# Patient Record
Sex: MALE | Race: WHITE | NOT HISPANIC OR LATINO | URBAN - METROPOLITAN AREA
[De-identification: names, ages, dates, MRNs, and addresses within clinical notes are randomized per-mention and may not be internally consistent; named-entity substitution may affect disease eponyms.]

---

## 2024-04-29 ENCOUNTER — EMERGENCY (EMERGENCY)
Facility: HOSPITAL | Age: 24
LOS: 1 days | Discharge: HOME | End: 2024-04-29
Attending: STUDENT IN AN ORGANIZED HEALTH CARE EDUCATION/TRAINING PROGRAM | Admitting: STUDENT IN AN ORGANIZED HEALTH CARE EDUCATION/TRAINING PROGRAM
Payer: COMMERCIAL

## 2024-04-29 VITALS
HEART RATE: 87 BPM | TEMPERATURE: 98 F | DIASTOLIC BLOOD PRESSURE: 85 MMHG | SYSTOLIC BLOOD PRESSURE: 121 MMHG | HEIGHT: 67 IN | WEIGHT: 145.06 LBS | OXYGEN SATURATION: 98 % | RESPIRATION RATE: 18 BRPM

## 2024-04-29 PROCEDURE — 73070 X-RAY EXAM OF ELBOW: CPT | Mod: 26,RT

## 2024-04-29 PROCEDURE — 73090 X-RAY EXAM OF FOREARM: CPT | Mod: 26,RT

## 2024-04-29 PROCEDURE — 73110 X-RAY EXAM OF WRIST: CPT | Mod: 26,RT

## 2024-04-29 PROCEDURE — 99285 EMERGENCY DEPT VISIT HI MDM: CPT

## 2024-04-29 RX ORDER — MORPHINE SULFATE 50 MG/1
4 CAPSULE, EXTENDED RELEASE ORAL ONCE
Refills: 0 | Status: DISCONTINUED | OUTPATIENT
Start: 2024-04-29 | End: 2024-04-29

## 2024-04-29 RX ORDER — OXYCODONE HYDROCHLORIDE 5 MG/1
5 TABLET ORAL ONCE
Refills: 0 | Status: DISCONTINUED | OUTPATIENT
Start: 2024-04-29 | End: 2024-04-29

## 2024-04-29 RX ADMIN — OXYCODONE HYDROCHLORIDE 5 MILLIGRAM(S): 5 TABLET ORAL at 22:52

## 2024-04-29 RX ADMIN — MORPHINE SULFATE 4 MILLIGRAM(S): 50 CAPSULE, EXTENDED RELEASE ORAL at 23:56

## 2024-04-29 NOTE — ED ADULT NURSE NOTE - CHIEF COMPLAINT QUOTE
fell while  skating; with pain to right arm;  no head, neck or back pain; no head strike; not on blood thinners

## 2024-04-29 NOTE — ED ADULT TRIAGE NOTE - CHIEF COMPLAINT QUOTE
fell while  skating; with pain to right arm;  no head, neck or back pain; no head strike; not on blood thinners fell while  skating; with pain to right arm;  no head, neck or back pain; no head strike or LOC; not on blood thinners

## 2024-04-29 NOTE — ED ADULT NURSE NOTE - OBJECTIVE STATEMENT
Pt A&ox4 and able to speak in complete sentences. Pt breathing even and unlabored with equal chest rise and fall. pt arrived s/p fall during skating in which he sustained residual right arm pain and weakness. pt denies numbness, tingling, sob, fevers, chills, n, v, cp.

## 2024-04-30 VITALS
OXYGEN SATURATION: 99 % | DIASTOLIC BLOOD PRESSURE: 88 MMHG | RESPIRATION RATE: 18 BRPM | HEART RATE: 76 BPM | SYSTOLIC BLOOD PRESSURE: 132 MMHG

## 2024-04-30 LAB
ANION GAP SERPL CALC-SCNC: 14 MMOL/L — SIGNIFICANT CHANGE UP (ref 5–17)
BASOPHILS # BLD AUTO: 0.02 K/UL — SIGNIFICANT CHANGE UP (ref 0–0.2)
BASOPHILS NFR BLD AUTO: 0.1 % — SIGNIFICANT CHANGE UP (ref 0–2)
BLD GP AB SCN SERPL QL: NEGATIVE — SIGNIFICANT CHANGE UP
BUN SERPL-MCNC: 12 MG/DL — SIGNIFICANT CHANGE UP (ref 7–23)
CALCIUM SERPL-MCNC: 9.4 MG/DL — SIGNIFICANT CHANGE UP (ref 8.4–10.5)
CHLORIDE SERPL-SCNC: 100 MMOL/L — SIGNIFICANT CHANGE UP (ref 96–108)
CO2 SERPL-SCNC: 26 MMOL/L — SIGNIFICANT CHANGE UP (ref 22–31)
CREAT SERPL-MCNC: 0.79 MG/DL — SIGNIFICANT CHANGE UP (ref 0.5–1.3)
EGFR: 129 ML/MIN/1.73M2 — SIGNIFICANT CHANGE UP
EOSINOPHIL # BLD AUTO: 0.02 K/UL — SIGNIFICANT CHANGE UP (ref 0–0.5)
EOSINOPHIL NFR BLD AUTO: 0.1 % — SIGNIFICANT CHANGE UP (ref 0–6)
GLUCOSE SERPL-MCNC: 96 MG/DL — SIGNIFICANT CHANGE UP (ref 70–99)
HCT VFR BLD CALC: 49.6 % — SIGNIFICANT CHANGE UP (ref 39–50)
HGB BLD-MCNC: 16.4 G/DL — SIGNIFICANT CHANGE UP (ref 13–17)
IMM GRANULOCYTES NFR BLD AUTO: 0.5 % — SIGNIFICANT CHANGE UP (ref 0–0.9)
LYMPHOCYTES # BLD AUTO: 1.04 K/UL — SIGNIFICANT CHANGE UP (ref 1–3.3)
LYMPHOCYTES # BLD AUTO: 7.3 % — LOW (ref 13–44)
MCHC RBC-ENTMCNC: 30.3 PG — SIGNIFICANT CHANGE UP (ref 27–34)
MCHC RBC-ENTMCNC: 33.1 GM/DL — SIGNIFICANT CHANGE UP (ref 32–36)
MCV RBC AUTO: 91.5 FL — SIGNIFICANT CHANGE UP (ref 80–100)
MONOCYTES # BLD AUTO: 0.47 K/UL — SIGNIFICANT CHANGE UP (ref 0–0.9)
MONOCYTES NFR BLD AUTO: 3.3 % — SIGNIFICANT CHANGE UP (ref 2–14)
NEUTROPHILS # BLD AUTO: 12.55 K/UL — HIGH (ref 1.8–7.4)
NEUTROPHILS NFR BLD AUTO: 88.7 % — HIGH (ref 43–77)
NRBC # BLD: 0 /100 WBCS — SIGNIFICANT CHANGE UP (ref 0–0)
PLATELET # BLD AUTO: 221 K/UL — SIGNIFICANT CHANGE UP (ref 150–400)
POTASSIUM SERPL-MCNC: 4.1 MMOL/L — SIGNIFICANT CHANGE UP (ref 3.5–5.3)
POTASSIUM SERPL-SCNC: 4.1 MMOL/L — SIGNIFICANT CHANGE UP (ref 3.5–5.3)
RBC # BLD: 5.42 M/UL — SIGNIFICANT CHANGE UP (ref 4.2–5.8)
RBC # FLD: 12.6 % — SIGNIFICANT CHANGE UP (ref 10.3–14.5)
RH IG SCN BLD-IMP: POSITIVE — SIGNIFICANT CHANGE UP
RH IG SCN BLD-IMP: POSITIVE — SIGNIFICANT CHANGE UP
SODIUM SERPL-SCNC: 140 MMOL/L — SIGNIFICANT CHANGE UP (ref 135–145)
WBC # BLD: 14.17 K/UL — HIGH (ref 3.8–10.5)
WBC # FLD AUTO: 14.17 K/UL — HIGH (ref 3.8–10.5)

## 2024-04-30 PROCEDURE — 85025 COMPLETE CBC W/AUTO DIFF WBC: CPT

## 2024-04-30 PROCEDURE — 73090 X-RAY EXAM OF FOREARM: CPT

## 2024-04-30 PROCEDURE — 36415 COLL VENOUS BLD VENIPUNCTURE: CPT

## 2024-04-30 PROCEDURE — 86850 RBC ANTIBODY SCREEN: CPT

## 2024-04-30 PROCEDURE — 73090 X-RAY EXAM OF FOREARM: CPT | Mod: 26,RT

## 2024-04-30 PROCEDURE — 86901 BLOOD TYPING SEROLOGIC RH(D): CPT

## 2024-04-30 PROCEDURE — 73070 X-RAY EXAM OF ELBOW: CPT

## 2024-04-30 PROCEDURE — 99284 EMERGENCY DEPT VISIT MOD MDM: CPT | Mod: 25

## 2024-04-30 PROCEDURE — 96374 THER/PROPH/DIAG INJ IV PUSH: CPT

## 2024-04-30 PROCEDURE — 86900 BLOOD TYPING SEROLOGIC ABO: CPT

## 2024-04-30 PROCEDURE — 73110 X-RAY EXAM OF WRIST: CPT

## 2024-04-30 PROCEDURE — 80048 BASIC METABOLIC PNL TOTAL CA: CPT

## 2024-04-30 RX ORDER — OXYCODONE HYDROCHLORIDE 5 MG/1
5 TABLET ORAL ONCE
Refills: 0 | Status: DISCONTINUED | OUTPATIENT
Start: 2024-04-30 | End: 2024-04-30

## 2024-04-30 RX ORDER — OXYCODONE HYDROCHLORIDE 5 MG/1
1 TABLET ORAL
Qty: 6 | Refills: 0
Start: 2024-04-30 | End: 2024-05-02

## 2024-04-30 RX ADMIN — OXYCODONE HYDROCHLORIDE 5 MILLIGRAM(S): 5 TABLET ORAL at 01:51

## 2024-04-30 NOTE — ED PROVIDER NOTE - PATIENT PORTAL LINK FT
You can access the FollowMyHealth Patient Portal offered by [facility] by registering at the following website: [email or web address]. By joining [employer]’s FollowMyHealth portal, you will also be able to view your health information using other applications (apps) compatible with our system.

## 2024-04-30 NOTE — ED PROVIDER NOTE - CLINICAL SUMMARY MEDICAL DECISION MAKING FREE TEXT BOX
no medical hx, was skateboarding and fell off skateboard and landed on right arm. immediate pain and noted deformity to R forearm. no head strike. splinted by EMS pta. has been ambulatory since.   pt appears uncomfortable but nontoxic, stable vitals, RUE - +swelling and deformity to mid-forearm. no tenderness to wrist or elbow. neurovascularly intact. compartments soft / compressible.  suspect forearm fracture, less likely elbow / wrist fracture.  hx and exam w/o evidence of other traumatic injuries that warrant imaging.   plan - xray, analgesia prn  ortho consult

## 2024-04-30 NOTE — CONSULT NOTE ADULT - SUBJECTIVE AND OBJECTIVE BOX
23y Male presents c/o R wrist pain s/p mechanical fall when ice skating. Denies Headstrike/LOC. Denies numbness, tingling paresthesias in affected extremity. Able to ambulate after fall. No other orthopedic injuries at this time.    PAST MEDICAL & SURGICAL HISTORY:    MEDICATIONS  (STANDING):    Allergies    penicillin (Unknown)    Intolerances                            16.4   14.17 )-----------( 221      ( 29 Apr 2024 23:50 )             49.6           Vital Signs Last 24 Hrs  T(C): 36.4 (04-29-24 @ 22:31), Max: 36.4 (04-29-24 @ 22:31)  T(F): 97.6 (04-29-24 @ 22:31), Max: 97.6 (04-29-24 @ 22:31)  HR: 87 (04-29-24 @ 22:31) (87 - 87)  BP: 121/85 (04-29-24 @ 22:31) (121/85 - 121/85)  BP(mean): --  RR: 18 (04-29-24 @ 22:31) (18 - 18)  SpO2: 98% (04-29-24 @ 22:31) (98% - 98%)  Gen: NAD  RUE: Skin intact, +gross deformity of the wrist, +ecchymosis, +ain/pin/m/r/u function, SILT C5-T1, radial pulse intact, compartments soft, brisk cap refill. DRUJ stable, no pain over the scaphoid, no ttp over elbow, full elbow sup/pro/flex/exten without pain    Secondary Survey: Full ROM of unaffected extremities, SILT globally, compartments soft, no bony TTP over bony prominences, no calf TTP, able to SLR with bilaterale LE, no TTP along axial spine  Imaging: XR demonstates R/L both bone forearm fracture     A/P: 23y Male with R both bone fracture s/p STS placement  -Pain control  -NWB RUE in sling for comfort  -Keep splint clean and nicki  -Ice, elevate extremity  -Active movement of fingers encouraged  -Signs and symptoms of compartment syndrome were discussed with the patient and the family was advised to return to ED if suspected compartment syndrome  -Likely need for surgical intervention in future discussed with patient  -Follow up with Dr. Llamas within 1 week, call office for appointment  -Ortho stable for DC 23y Male presents c/o R forearm pain s/p mechanical fall when skateboarding. Denies Headstrike/LOC. Denies numbness, tingling paresthesias in affected extremity. Able to ambulate after fall. No other orthopedic injuries at this time.    PAST MEDICAL & SURGICAL HISTORY:    MEDICATIONS  (STANDING):    Allergies    penicillin (Unknown)    Intolerances                            16.4   14.17 )-----------( 221      ( 29 Apr 2024 23:50 )             49.6           Vital Signs Last 24 Hrs  T(C): 36.4 (04-29-24 @ 22:31), Max: 36.4 (04-29-24 @ 22:31)  T(F): 97.6 (04-29-24 @ 22:31), Max: 97.6 (04-29-24 @ 22:31)  HR: 87 (04-29-24 @ 22:31) (87 - 87)  BP: 121/85 (04-29-24 @ 22:31) (121/85 - 121/85)  BP(mean): --  RR: 18 (04-29-24 @ 22:31) (18 - 18)  SpO2: 98% (04-29-24 @ 22:31) (98% - 98%)  Gen: NAD  RUE: Skin intact, +gross deformity of the wrist, +ecchymosis, +ain/pin/m/r/u function, SILT C5-T1, radial pulse intact, compartments soft, brisk cap refill. DRUJ stable, no pain over the scaphoid, no ttp over elbow, full elbow sup/pro/flex/exten without pain    Secondary Survey: Full ROM of unaffected extremities, SILT globally, compartments soft, no bony TTP over bony prominences, no calf TTP, able to SLR with bilaterale LE, no TTP along axial spine  Imaging: XR demonstates R/L both bone forearm fracture     A/P: 23y Male with R both bone fracture s/p STS placement  -Pain control  -NWB RUE in sling for comfort  -Keep splint clean and nicki  -Ice, elevate extremity  -Active movement of fingers encouraged  -Signs and symptoms of compartment syndrome were discussed with the patient and the family was advised to return to ED if suspected compartment syndrome  -Likely need for surgical intervention in future discussed with patient  -Follow up with Dr. Llamas within 1 week, call office for appointment  -Ortho stable for DC

## 2024-04-30 NOTE — ED PROVIDER NOTE - NSFOLLOWUPINSTRUCTIONS_ED_ALL_ED_FT
Keep arm in splint. Keep splint clean / dry / in place until you are seen by orthopedics.   Wear sling for comfort.   Take tylenol / motrin for pain.   Take oxycodone for severe pain.     Follow up with Orthopedics within 1-2 weeks for further / continued evaluation. See office information listed here.     Return to the Emergency Department if you have any worsening or new symptoms such as inability to move the arm, numbness/tingling/paresthesias, severe swelling/redness, pain that cannot be controlled with over the counter medication, any chest pain or shortness of breath, or any other serious concerns.

## 2024-04-30 NOTE — ED PROVIDER NOTE - CARE PROVIDER_API CALL
Mo Llamas  Orthopaedic Surgery  [address]  [place] [address]  Phone: [phone]  Fax: [phone]  Follow Up Time:

## 2024-04-30 NOTE — ED PROVIDER NOTE - OBJECTIVE STATEMENT
23 yr old male, no medical hx, presents to the Emergency Department w arm pain. pt was skateboarding and fell off skateboard and landed on right arm. immediate pain and noted deformity to R forearm. no head strike, no loc, no ac/asa use. splinted by orthopedics pta. has been ambulatory since. no extremity weakness / numbness. no pain to elbow or shoulder. 23 yr old male, no medical hx, presents to the Emergency Department w arm pain. pt was skateboarding and fell off skateboard and landed on right arm. immediate pain and noted deformity to R forearm. no head strike, no loc, no ac/asa use. splinted by EMS pta. has been ambulatory since. no extremity weakness / numbness. no pain to elbow or shoulder.

## 2024-04-30 NOTE — ED PROVIDER NOTE - PHYSICAL EXAMINATION
RUE - skin intact. +swelling and deformity to forearm w diffuse tenderness. with support able to range at elbow and wrist no tenderness to elbow, wrist, digits, shoulder. 2+ radial pulses. cap refill <2 seconds. sensation intact all distributions distally. moving all digits.     Constitutional : non-toxic, no acute distress. awake, alert, oriented to person, place, time/situation.  Head : head normocephalic, atraumatic  EENMT : eyes clear bilaterally, PERRL. airway patent. neck supple.  Cardiac : Regular rate. Extremities warm and well perfused. 2+ radial and DP pulses. cap refill <2 seconds. no LE edema.  Resp : Respirations even and unlabored.   MSK :  range of motion is not limited. moving all extremities.  Back : No evidence of trauma.   Neuro : Alert and oriented, CNII-XII grossly intact, no motor or sensory deficits.  Skin : Skin normal color for race, warm, dry and intact. No evidence of rash.  Psych : Alert and oriented to person, place, time/situation. normal mood and affect. no apparent risk to self or others. RUE - skin intact. +swelling and deformity to forearm w diffuse tenderness. with support able to range at elbow and wrist no tenderness to elbow, wrist, digits, shoulder. 2+ radial pulses. cap refill <2 seconds. sensation intact all distributions distally. moving all digits. compartments of forearm soft, easily compressible.     Constitutional : non-toxic, no acute distress. awake, alert, oriented to person, place, time/situation.  Head : head normocephalic, atraumatic  EENMT : eyes clear bilaterally, PERRL. airway patent. neck supple.  Cardiac : Regular rate. Extremities warm and well perfused. 2+ radial and DP pulses. cap refill <2 seconds. no LE edema.  Resp : Respirations even and unlabored.   MSK :  range of motion is not limited. moving all extremities.  Back : No evidence of trauma.   Neuro : Alert and oriented, CNII-XII grossly intact, no motor or sensory deficits.  Skin : Skin normal color for race, warm, dry and intact. No evidence of rash.  Psych : Alert and oriented to person, place, time/situation. normal mood and affect. no apparent risk to self or others.

## 2024-04-30 NOTE — ED PROVIDER NOTE - PROGRESS NOTE DETAILS
XR w midshaft radius / ulna fracture  ortho consulted. splinted by ortho and post splint XR ok.   remains neurovascularly intact in splint. pain controlled. ok for dc and outpt follow up w ortho.   return precautions re compartment syndrome discussed.     All results reviewed with the patient verbally. Discharge plan and return precautions d/w pt who verbalized understanding and agrees with plan. All questions answered. Vitals WNL. Ready for d/c.

## 2024-05-03 ENCOUNTER — EMERGENCY (EMERGENCY)
Facility: HOSPITAL | Age: 24
LOS: 1 days | Discharge: HOME | End: 2024-05-03
Attending: EMERGENCY MEDICINE | Admitting: EMERGENCY MEDICINE
Payer: COMMERCIAL

## 2024-05-03 VITALS
RESPIRATION RATE: 17 BRPM | OXYGEN SATURATION: 97 % | HEART RATE: 53 BPM | DIASTOLIC BLOOD PRESSURE: 72 MMHG | WEIGHT: 154.98 LBS | SYSTOLIC BLOOD PRESSURE: 124 MMHG | TEMPERATURE: 98 F | HEIGHT: 67 IN

## 2024-05-03 VITALS
DIASTOLIC BLOOD PRESSURE: 80 MMHG | RESPIRATION RATE: 18 BRPM | SYSTOLIC BLOOD PRESSURE: 128 MMHG | OXYGEN SATURATION: 98 % | HEART RATE: 55 BPM

## 2024-05-03 DIAGNOSIS — M79.631 PAIN IN RIGHT FOREARM: ICD-10-CM
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
## 2024-05-03 DIAGNOSIS — G89.18 OTHER ACUTE POSTPROCEDURAL PAIN: ICD-10-CM

## 2024-05-03 DIAGNOSIS — R20.0 ANESTHESIA OF SKIN: ICD-10-CM

## 2024-05-03 DIAGNOSIS — R29.898 OTHER SYMPTOMS AND SIGNS INVOLVING THE MUSCULOSKELETAL SYSTEM: ICD-10-CM

## 2024-05-03 DIAGNOSIS — Z88.0 ALLERGY STATUS TO PENICILLIN: ICD-10-CM
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
## 2024-05-03 DIAGNOSIS — M79.601 PAIN IN RIGHT ARM: ICD-10-CM

## 2024-05-03 DIAGNOSIS — S52.611A DISPLACED FRACTURE OF RIGHT ULNA STYLOID PROCESS, INITIAL ENCOUNTER FOR CLOSED FRACTURE: ICD-10-CM
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
## 2024-05-03 PROCEDURE — 99284 EMERGENCY DEPT VISIT MOD MDM: CPT | Mod: 25

## 2024-05-03 PROCEDURE — 99283 EMERGENCY DEPT VISIT LOW MDM: CPT

## 2024-05-03 PROCEDURE — 96372 THER/PROPH/DIAG INJ SC/IM: CPT

## 2024-05-03 RX ORDER — OXYCODONE HYDROCHLORIDE 5 MG/1
5 TABLET ORAL ONCE
Refills: 0 | Status: DISCONTINUED | OUTPATIENT
Start: 2024-05-03 | End: 2024-05-03

## 2024-05-03 RX ORDER — HYDROMORPHONE HYDROCHLORIDE 2 MG/ML
1 INJECTION INTRAMUSCULAR; INTRAVENOUS; SUBCUTANEOUS ONCE
Refills: 0 | Status: DISCONTINUED | OUTPATIENT
Start: 2024-05-03 | End: 2024-05-03

## 2024-05-03 RX ORDER — KETOROLAC TROMETHAMINE 30 MG/ML
30 SYRINGE (ML) INJECTION ONCE
Refills: 0 | Status: DISCONTINUED | OUTPATIENT
Start: 2024-05-03 | End: 2024-05-03

## 2024-05-03 RX ORDER — OXYCODONE AND ACETAMINOPHEN 5; 325 MG/1; MG/1
1 TABLET ORAL
Qty: 8 | Refills: 0
Start: 2024-05-03 | End: 2024-05-06

## 2024-05-03 RX ADMIN — Medication 30 MILLIGRAM(S): at 05:01

## 2024-05-03 RX ADMIN — OXYCODONE HYDROCHLORIDE 5 MILLIGRAM(S): 5 TABLET ORAL at 05:01

## 2024-05-03 NOTE — ED PROVIDER NOTE - CARE PROVIDER_API CALL
Mo Llamas  Orthopaedic Surgery  [address]  [place] [address]  Phone: [phone]  Fax: [phone]  Follow Up Time:

## 2024-05-03 NOTE — ED PROVIDER NOTE - CARE PLAN
1 Principal Discharge DX:	H/O forearm fracture  Secondary Diagnosis:	Encounter for pain management

## 2024-05-03 NOTE — ED PROVIDER NOTE - ATTENDING APP SHARED VISIT CONTRIBUTION OF CARE
23M s/p ORIF of right radial/ulnar fracture 2 days ago c/o pain to arm. states fingers and swollen and painful. taking percocet for pain without relief.   gen- nad  ext -wwp, RUE in cast, able to move fingers  neuro -aox3, steady gait, marquez  seen by ortho - given pain meds

## 2024-05-03 NOTE — ED PROVIDER NOTE - PROGRESS NOTE DETAILS
pt recommended to have robust pain control regimen while in the ER, will observe. Ortho team will re-evaluate pt in a few hours.

## 2024-05-03 NOTE — ED PROVIDER NOTE - OBJECTIVE STATEMENT
23 male 2days s/p surgical repair of right forearm fx  present to Er c/o severe pain to his injured extremity. pt states his right fingers are swollen, painful and numb. Pt has been taking Percocet for pain as prescribed.

## 2024-05-03 NOTE — ED PROVIDER NOTE - CLINICAL SUMMARY MEDICAL DECISION MAKING FREE TEXT BOX
23 male 2days s/p surgical repair of right forearm fx  present to Er c/o severe pain to his injured extremity. pt states his right fingers are swollen, painful and numb. Pt has been taking Percocet for pain as prescribed.  Pt afebrile and well appearing. right UE cast in place, his right digits are not discolored and well perfused, cap. refill brisk. Ortho on call is at the bedside. Disposition as per ortho team.

## 2024-05-03 NOTE — CONSULT NOTE ADULT - SUBJECTIVE AND OBJECTIVE BOX
Orthopaedic Surgery Consult Note    For Surgeon: Muriel     Patient is a 23y old  Male who presents s/p both bone R forearm ORIF 1 day prior with pain and weakness after block wore off    Endorses some numbness in his entire hand with weakness of his thumb extension.     Denies fevers, chills, nausea vomiting   HPI:      Allergies    penicillin (Unknown)    Intolerances        MEDICATIONS  (STANDING):    MEDICATIONS  (PRN):      PAST MEDICAL & SURGICAL HISTORY:      Social History:      FAMILY HISTORY:        Vital Signs Last 24 Hrs  T(C): 36.8 (03 May 2024 03:13), Max: 36.8 (03 May 2024 03:13)  T(F): 98.2 (03 May 2024 03:13), Max: 98.2 (03 May 2024 03:13)  HR: 55 (03 May 2024 06:12) (53 - 55)  BP: 128/80 (03 May 2024 06:12) (124/72 - 128/80)  BP(mean): --  RR: 18 (03 May 2024 06:12) (17 - 18)  SpO2: 98% (03 May 2024 06:12) (97% - 98%)    Parameters below as of 03 May 2024 06:12  Patient On (Oxygen Delivery Method): room air        PE:  General: Well developed, well nourished, in no acute distress, comfortable  Neuro: Alert, oriented x 3  Psych: Normal mood & affect   Skin: Warm, dry, extremities well perfused, no obvious rash  MSK:  splint and dressing taken down. Immediate relief by patient   R 3/5 EHL, decreased sensation in R hand diffusely nondermatomal   incision sites clean, replaced with xeroform gauze webroll replaced splint with ace wrap              Imaging: none obtaine    A/P: 23yMale s/p both bone R forearm ORIF 1 day prior with pain and weakness after block wore off  NWB RUE  pain control  ELevation  loosened and redressed splint, likely casing compression sxs  F.u. with Dr. Llamas outpatient  - Patient history, exam, imaging and plan discussed with Dr. Llamas     Ortho Pager [phone]

## 2024-05-03 NOTE — ED ADULT NURSE NOTE - OBJECTIVE STATEMENT
pt walked in aox4 c/o R arm pain. pt had surgery yesterday AM, given percocet and ibuprofen prescriptions and nerve block. pt states nerve block wore off and percocet/ibuprofen are not helping. pt last took percocet and ibuprofen at 2 AM.

## 2024-05-03 NOTE — ED ADULT TRIAGE NOTE - CHIEF COMPLAINT QUOTE
Pt presents with c/o "uncontrolled pain", s/p orthopedic sx on 5/2 to rt radius and ulna secondary to fx, states "I had 3 plates put in". Has been taking Percocet and Ibuprofen with no relief. Unable to reach orthopedist, Dr. Llamas. CR and sensation WNL to affected extremity. Presents with cast/sling. HR noted in 50's in triage, states "I normally am low". Last Percocet/Ibuprofen at 0200.

## 2024-05-03 NOTE — ED PROVIDER NOTE - PATIENT PORTAL LINK FT
You can access the FollowMyHealth Patient Portal offered by [facility] by registering at the following website: [email or web address]. By joining [employer]’s FollowMyHealth portal, you will also be able to view your health information using other applications (apps) compatible with our system.

## 2024-05-03 NOTE — ED PROVIDER NOTE - NSFOLLOWUPINSTRUCTIONS_ED_ALL_ED_FT
Take medications as prescribed and follow up with your surgeon for re-evaluation next week.     Cast or Splint Care, Adult  Casts and splints are supports that are worn to protect broken bones and other injuries. A cast or splint may hold a bone still and in the correct position while it heals. Casts and splints may also help with pain, swelling, and muscle spasms.    A cast is a hardened support that is usually made of fiberglass or plaster. It is custom-fit to the body and offers more protection than a splint. Most casts cannot be taken off and put back on. A splint is a type of soft support that is usually made from cloth and elastic. It can be adjusted or taken off as needed. Often, when a bone is broken, a splint is put on until the swelling goes down, and then the splint is replaced by a cast.    You may need a cast or a splint if you:  Have a broken bone.  Have a soft-tissue injury.  Need to keep an injured body part from moving (keep it immobile) after surgery.  What are the risks?  In some cases, wearing a cast or splint can cause a reduced blood supply to the wrist or hand or to the foot and toes. This can happen if there is a lot of swelling or if the cast or splint is too tight. Limited blood supply results in a condition called compartment syndrome and can cause permanent damage. Symptoms include:  Pain that is getting worse.  Numbness and tingling.  Changes in skin color, including paleness or a bluish color.  Cold fingers or toes.  Other complications of wearing a cast or splint can include:  Skin irritation that can cause itching, rash, skin sores, or skin infection.  Limb stiffness or weakness.  How to care for a nonremovable cast or splint  A person checking the skin around a cast on his foot and lower leg.  Do not put pressure on any part of the cast or splint until it is fully hardened. This may take several hours.  Check the skin around the cast or splint every day. Tell your health care provider about any concerns.  Do not stick anything inside the cast or splint to scratch your skin. Doing that increases your risk of infection.  You may put lotion on dry skin around the edges of the cast or splint. Do not put lotion on the skin underneath the cast or splint.  Keep the cast or splint clean and dry.  How to care for a removable splint  Wear the splint as told by your health care provider. Remove it only as told by your health care provider.  Check the skin around the splint every day. Tell your health care provider about any concerns.  Loosen the splint if your fingers tingle, become numb, or turn cold and blue.  Keep the splint clean and dry. Clean your splint as told by your health care provider. Use mild soap and water and let it air-dry. Do not use heat on your splint.  Follow these instructions at home:  Bathing    Do not take baths, swim, or use a hot tub until your health care provider approves. Ask your health care provider if you may take showers. You may only be allowed to take sponge baths.  If your cast or splint is not waterproof:  Do not let it get wet.  Cover it with a watertight covering when you take a bath or shower.  Managing pain, stiffness, and swelling    A person resting with her lower leg elevated.   If directed, put ice on the affected area. To do this:  If you have a removable cast or splint, remove it as told by your health care provider.  Put ice in a plastic bag.  Place a towel between your skin and the bag or between your cast and the bag.  Leave the ice on for 20 minutes, 2–3 times a day.  Remove the ice if your skin turns bright red. This is very important. If you cannot feel pain, heat, or cold, you have a greater risk of damage to the area.  Move your fingers or toes often to reduce stiffness and swelling.  Raise (elevate) the injured area above the level of your heart while you are sitting or lying down.  Safety    Do not use the injured limb to support your body weight until your health care provider says that you can. Use crutches or other assistive devices as told by your health care provider.  Ask your health care provider when it is safe to drive if you have a cast or splint on part of your body.  General instructions    Take over-the-counter and prescription medicines only as told by your health care provider.  Return to your normal activities as told by your health care provider. Ask your health care provider what activities are safe for you.  Keep all follow-up visits. This is important.  Contact a health care provider if:  The skin around the cast or splint gets red or raw.  The skin under the cast is extremely itchy or painful.  Your cast or splint:  Gets damaged.  Feels very uncomfortable.  Is too tight or too loose.  Your cast becomes wet or develops a soft spot or area.  You notice a bad smell coming from under your cast.  You get an object stuck under your cast.  There is fluid leaking through the cast.  Get help right away if:  You develop any symptoms of compartment syndrome, such as:  Severe pain or pressure under the cast.  Numbness, tingling, coldness, or pale or bluish skin.  The part of your body above or below the cast is swollen and discolored.  You cannot feel or move your fingers or toes.  You have trouble breathing or shortness of breath.  You have chest pain.  Your pain gets worse.  These symptoms may represent a serious problem that is an emergency. Do not wait to see if the symptoms will go away. Get medical help right away. Call your local emergency services (911 in the [place] Do not drive yourself to the hospital.    Summary  Casts and splints are worn to protect broken bones and other injuries.  Casts and splints should remain clean and dry.  Remove your cast or splint only as told by your health care provider.  Get help right away if your pain gets worse, or if you have numbness, tingling, or skin that turns cold, blue, or discolored.  This information is not intended to replace advice given to you by your health care provider. Make sure you discuss any questions you have with your health care provider.

## 2024-05-03 NOTE — ED PROVIDER NOTE - PHYSICAL EXAMINATION
Constitutional: awake and alert, in no acute distress  HEENT: head normocephalic and atraumatic. moist mucous membranes  Eyes: extraocular movements intact, normal conjunctiva  Neck: supple, normal ROM  Cardiovascular: regular rate   Pulmonary: no respiratory distress  Gastrointestinal: abdomen flat and nondistended  Skin: warm, dry, normal for ethnicity  Musculoskeletal:  right UE in cast, right digits no discolorations, brisk cap. refill,   Neurological: oriented x4, no focal neurologic deficit.   Psychiatric: calm and cooperative, no SI/HI

## 2024-05-08 ENCOUNTER — APPOINTMENT (OUTPATIENT)
Dept: RADIOLOGY | Facility: CLINIC | Age: 24
End: 2024-05-08

## 2024-05-08 ENCOUNTER — OUTPATIENT (OUTPATIENT)
Dept: OUTPATIENT SERVICES | Facility: HOSPITAL | Age: 24
LOS: 1 days | End: 2024-05-08
Payer: COMMERCIAL

## 2024-05-08 PROCEDURE — 73090 X-RAY EXAM OF FOREARM: CPT | Mod: 26,RT

## 2024-06-03 ENCOUNTER — APPOINTMENT (OUTPATIENT)
Dept: RADIOLOGY | Facility: CLINIC | Age: 24
End: 2024-06-03
Payer: COMMERCIAL

## 2024-06-03 ENCOUNTER — OUTPATIENT (OUTPATIENT)
Dept: OUTPATIENT SERVICES | Facility: HOSPITAL | Age: 24
LOS: 1 days | End: 2024-06-03

## 2024-06-03 PROCEDURE — 73090 X-RAY EXAM OF FOREARM: CPT | Mod: 26,RT
